# Patient Record
Sex: MALE | Race: WHITE | NOT HISPANIC OR LATINO | Employment: FULL TIME | RURAL
[De-identification: names, ages, dates, MRNs, and addresses within clinical notes are randomized per-mention and may not be internally consistent; named-entity substitution may affect disease eponyms.]

---

## 2022-05-28 PROCEDURE — 99283 PR EMERGENCY DEPT VISIT,LEVEL III: ICD-10-PCS | Mod: ,,, | Performed by: EMERGENCY MEDICINE

## 2022-05-28 PROCEDURE — 99283 EMERGENCY DEPT VISIT LOW MDM: CPT | Mod: ,,, | Performed by: EMERGENCY MEDICINE

## 2022-05-28 PROCEDURE — 99283 EMERGENCY DEPT VISIT LOW MDM: CPT

## 2022-05-29 ENCOUNTER — HOSPITAL ENCOUNTER (EMERGENCY)
Facility: HOSPITAL | Age: 32
Discharge: HOME | End: 2022-05-29
Attending: EMERGENCY MEDICINE

## 2022-05-29 VITALS
WEIGHT: 160 LBS | OXYGEN SATURATION: 97 % | HEART RATE: 107 BPM | TEMPERATURE: 98 F | RESPIRATION RATE: 18 BRPM | SYSTOLIC BLOOD PRESSURE: 112 MMHG | DIASTOLIC BLOOD PRESSURE: 67 MMHG | BODY MASS INDEX: 22.4 KG/M2 | HEIGHT: 71 IN

## 2022-05-29 DIAGNOSIS — R52 PAIN: ICD-10-CM

## 2022-05-29 DIAGNOSIS — S93.401A SPRAIN OF RIGHT ANKLE, UNSPECIFIED LIGAMENT, INITIAL ENCOUNTER: Primary | ICD-10-CM

## 2022-05-29 RX ORDER — IBUPROFEN 600 MG/1
600 TABLET ORAL EVERY 6 HOURS PRN
Qty: 20 TABLET | Refills: 0 | Status: SHIPPED | OUTPATIENT
Start: 2022-05-29

## 2022-05-29 NOTE — ED PROVIDER NOTES
Encounter Date: 5/28/2022       History     Chief Complaint   Patient presents with    Ankle Pain     Pt state injured right ankle at work at approx 1400 today,  swelling noted to right ankle     31-year-old male patient came to the emergency department complaining of right ankle pain.  The patient states that he was walking and he twisted his right ankle started having pain and he was unable to put weight on his right foot.  There is no other reported injury.    The history is provided by the patient.     Review of patient's allergies indicates:  No Known Allergies  History reviewed. No pertinent past medical history.  No past surgical history on file.  History reviewed. No pertinent family history.     Review of Systems   Constitutional: Negative for fever.   HENT: Negative for sore throat.    Respiratory: Negative for shortness of breath.    Cardiovascular: Negative for chest pain.   Gastrointestinal: Negative for nausea.   Genitourinary: Negative for dysuria.   Musculoskeletal: Positive for joint swelling. Negative for back pain.   Skin: Negative for rash.   Neurological: Negative for weakness.   Hematological: Does not bruise/bleed easily.   All other systems reviewed and are negative.      Physical Exam     Initial Vitals [05/28/22 2359]   BP Pulse Resp Temp SpO2   112/67 107 18 98.1 °F (36.7 °C) 97 %      MAP       --         Physical Exam    Constitutional: He appears well-developed and well-nourished. He is diaphoretic. He appears distressed.   HENT:   Head: Normocephalic and atraumatic.   Eyes: EOM are normal. Pupils are equal, round, and reactive to light. Right eye exhibits no discharge. Left eye exhibits no discharge.   Neck: Neck supple.   Normal range of motion.  Pulmonary/Chest: No respiratory distress.   Musculoskeletal:         General: Tenderness (Right ankle) present.      Cervical back: Normal range of motion and neck supple.        Feet:      Neurological: He is alert and oriented to person,  Letter sent to Pt with normal pap results.    place, and time.   Skin: Skin is warm.         Medical Screening Exam   See Full Note    ED Course   Procedures  Labs Reviewed - No data to display       Imaging Results          X-Ray Ankle Complete Right (Final result)  Result time 05/29/22 08:50:15    Final result by Bne Ross MD (05/29/22 08:50:15)                 Impression:      Soft tissue swelling about the ankle medially    No definite acute bony abnormality      Electronically signed by: Ben Ross  Date:    05/29/2022  Time:    08:50             Narrative:    EXAMINATION:  XR ANKLE COMPLETE 3 VIEW RIGHT    CLINICAL HISTORY:  .  Pain, unspecified.  Right ankle pain.  Ankle injury    COMPARISON:  None    TECHNIQUE:  AP, lateral, and oblique views right ankle    FINDINGS:  There is no acute fracture or dislocation.  Ankle mortise is well preserved.  There is no focal lytic or blastic lesion.    There is soft tissue swelling about the ankle medially                                 Medications - No data to display                    Clinical Impression:   Final diagnoses:  [R52] Pain  [S93.401A] Sprain of right ankle, unspecified ligament, initial encounter (Primary)          ED Disposition Condition    Discharge Stable        ED Prescriptions     Medication Sig Dispense Start Date End Date Auth. Provider    ibuprofen (ADVIL,MOTRIN) 600 MG tablet Take 1 tablet (600 mg total) by mouth every 6 (six) hours as needed for Pain. 20 tablet 5/29/2022  Domingo Arzate MD        Follow-up Information    None          Domingo Arzate MD  05/30/22 3094

## 2022-07-20 ENCOUNTER — HOSPITAL ENCOUNTER (EMERGENCY)
Facility: HOSPITAL | Age: 32
Discharge: HOME | End: 2022-07-20

## 2022-07-20 VITALS
TEMPERATURE: 99 F | HEART RATE: 98 BPM | OXYGEN SATURATION: 98 % | DIASTOLIC BLOOD PRESSURE: 89 MMHG | RESPIRATION RATE: 20 BRPM | WEIGHT: 159 LBS | BODY MASS INDEX: 22.26 KG/M2 | HEIGHT: 71 IN | SYSTOLIC BLOOD PRESSURE: 122 MMHG

## 2022-07-20 DIAGNOSIS — H92.02 OTALGIA, LEFT: ICD-10-CM

## 2022-07-20 DIAGNOSIS — J02.9 VIRAL PHARYNGITIS: Primary | ICD-10-CM

## 2022-07-20 DIAGNOSIS — H66.92 LEFT OTITIS MEDIA, UNSPECIFIED OTITIS MEDIA TYPE: ICD-10-CM

## 2022-07-20 LAB
FLUAV AG UPPER RESP QL IA.RAPID: NEGATIVE
FLUBV AG UPPER RESP QL IA.RAPID: NEGATIVE
RAPID GROUP A STREP: NEGATIVE
SARS-COV+SARS-COV-2 AG RESP QL IA.RAPID: NEGATIVE

## 2022-07-20 PROCEDURE — 87880 STREP A ASSAY W/OPTIC: CPT | Performed by: NURSE PRACTITIONER

## 2022-07-20 PROCEDURE — 99283 EMERGENCY DEPT VISIT LOW MDM: CPT | Mod: ,,, | Performed by: NURSE PRACTITIONER

## 2022-07-20 PROCEDURE — 99283 PR EMERGENCY DEPT VISIT,LEVEL III: ICD-10-PCS | Mod: ,,, | Performed by: NURSE PRACTITIONER

## 2022-07-20 PROCEDURE — 99283 EMERGENCY DEPT VISIT LOW MDM: CPT

## 2022-07-20 PROCEDURE — 87428 SARSCOV & INF VIR A&B AG IA: CPT | Performed by: NURSE PRACTITIONER

## 2022-07-20 RX ORDER — AMOXICILLIN AND CLAVULANATE POTASSIUM 875; 125 MG/1; MG/1
1 TABLET, FILM COATED ORAL 2 TIMES DAILY
Qty: 14 TABLET | Refills: 0 | Status: SHIPPED | OUTPATIENT
Start: 2022-07-20

## 2022-07-20 NOTE — ED PROVIDER NOTES
"Encounter Date: 7/20/2022       History     Chief Complaint   Patient presents with    Sore Throat     31 year old male presents to ED with complaint of throat soreness, left ear pain, and headache for 2 days. He states headache is frontal around eyes and is an ache. Ear described as throbbing fullness. Throat soreness with feeling of "something stuck" to distal part of throat when swallowing. Denies fever, chills, cough, shortness of breath, chest pain. No nausea/vomiting    The history is provided by the patient. No  was used.   Sore Throat   This is a new problem. The sore throat symptoms include difficulty swallowing and a foreign body sensation.The current episode started two days ago. The problem has been unchanged. The pain is worse on the left side. There has been no fever. The pain is at a severity of 5/10. Associated symptoms include ear pain and headaches. Pertinent negatives include no coughing, shortness of breath or vomiting. He has tried nothing for the symptoms. The treatment provided no relief.     Review of patient's allergies indicates:  No Known Allergies  No past medical history on file.  No past surgical history on file.  No family history on file.     Review of Systems   Constitutional: Negative for chills, fatigue and fever.   HENT: Positive for ear pain, postnasal drip, sinus pressure, sinus pain and sore throat.    Eyes: Negative for photophobia and visual disturbance.   Respiratory: Negative for cough and shortness of breath.    Cardiovascular: Negative for chest pain and palpitations.   Gastrointestinal: Negative for nausea and vomiting.   Neurological: Positive for headaches. Negative for weakness.   All other systems reviewed and are negative.      Physical Exam     Initial Vitals [07/20/22 1031]   BP Pulse Resp Temp SpO2   (!) 127/95 107 16 98.7 °F (37.1 °C) 98 %      MAP       --         Physical Exam    Nursing note and vitals reviewed.  Constitutional: He " appears well-developed and well-nourished.   HENT:   Head: Normocephalic and atraumatic.   Left Ear: Tympanic membrane is injected.   Mouth/Throat: Oropharyngeal exudate and posterior oropharyngeal erythema present.       Malodorous breath   Eyes: EOM are normal. Pupils are equal, round, and reactive to light.   Neck:   Normal range of motion.  Cardiovascular: Normal rate and regular rhythm.   Pulmonary/Chest: Breath sounds normal. He has no wheezes. He has no rhonchi.   Abdominal: Abdomen is soft. He exhibits no distension. There is no abdominal tenderness.   Musculoskeletal:         General: No tenderness or edema.      Cervical back: Normal range of motion.     Lymphadenopathy:     He has cervical adenopathy.   Neurological: He is alert and oriented to person, place, and time.   Skin: Skin is warm. Capillary refill takes less than 2 seconds.   Psychiatric: He has a normal mood and affect. Thought content normal.         Medical Screening Exam   See Full Note    ED Course   Procedures  Labs Reviewed   THROAT SCREEN, RAPID STREP - Normal   SARS-COV2 (COVID) W/ FLU ANTIGEN - Normal    Narrative:     Negative SARS-CoV results should not be used as the sole basis for treatment or patient management decisions; negative results should be considered in the context of a patient's recent exposures, history and the presene of clinical signs and symptoms consistent with COVID-19.  Negative results should be treated as presumptive and confirmed by molecular assay, if necessary for patient management.          Imaging Results    None          Medications - No data to display                    Clinical Impression:   Final diagnoses:  [J02.9] Viral pharyngitis (Primary)  [H92.02] Otalgia, left  [H66.92] Left otitis media, unspecified otitis media type          ED Disposition Condition    Discharge Stable        ED Prescriptions     Medication Sig Dispense Start Date End Date Auth. Provider    amoxicillin-clavulanate 875-125mg  (AUGMENTIN) 875-125 mg per tablet Take 1 tablet by mouth 2 (two) times daily. 14 tablet 7/20/2022  JOSEFINA Dsouza        Follow-up Information    None          Courtney Rich, JOSEFINA  07/20/22 2379

## 2022-07-22 ENCOUNTER — HOSPITAL ENCOUNTER (EMERGENCY)
Facility: HOSPITAL | Age: 32
Discharge: HOME | End: 2022-07-22
Attending: EMERGENCY MEDICINE

## 2022-07-22 VITALS
SYSTOLIC BLOOD PRESSURE: 120 MMHG | TEMPERATURE: 98 F | BODY MASS INDEX: 22.4 KG/M2 | RESPIRATION RATE: 16 BRPM | OXYGEN SATURATION: 97 % | DIASTOLIC BLOOD PRESSURE: 92 MMHG | WEIGHT: 160 LBS | HEIGHT: 71 IN | HEART RATE: 98 BPM

## 2022-07-22 DIAGNOSIS — G51.0 BELL'S PALSY: Primary | ICD-10-CM

## 2022-07-22 DIAGNOSIS — H66.90 OTITIS MEDIA, UNSPECIFIED LATERALITY, UNSPECIFIED OTITIS MEDIA TYPE: ICD-10-CM

## 2022-07-22 DIAGNOSIS — J02.9 PHARYNGITIS, UNSPECIFIED ETIOLOGY: ICD-10-CM

## 2022-07-22 PROCEDURE — 63600175 PHARM REV CODE 636 W HCPCS: Performed by: EMERGENCY MEDICINE

## 2022-07-22 PROCEDURE — 99283 PR EMERGENCY DEPT VISIT,LEVEL III: ICD-10-PCS | Mod: ,,, | Performed by: EMERGENCY MEDICINE

## 2022-07-22 PROCEDURE — 99283 EMERGENCY DEPT VISIT LOW MDM: CPT | Mod: ,,, | Performed by: EMERGENCY MEDICINE

## 2022-07-22 PROCEDURE — 99284 EMERGENCY DEPT VISIT MOD MDM: CPT

## 2022-07-22 PROCEDURE — 96372 THER/PROPH/DIAG INJ SC/IM: CPT

## 2022-07-22 PROCEDURE — 25000003 PHARM REV CODE 250: Performed by: EMERGENCY MEDICINE

## 2022-07-22 RX ORDER — MECLIZINE HYDROCHLORIDE 25 MG/1
25 TABLET ORAL
Status: COMPLETED | OUTPATIENT
Start: 2022-07-22 | End: 2022-07-22

## 2022-07-22 RX ORDER — PREDNISONE 20 MG/1
TABLET ORAL
Qty: 22 TABLET | Refills: 0 | Status: SHIPPED | OUTPATIENT
Start: 2022-07-22

## 2022-07-22 RX ORDER — MECLIZINE HYDROCHLORIDE 25 MG/1
25 TABLET ORAL 3 TIMES DAILY PRN
Qty: 20 TABLET | Refills: 0 | Status: SHIPPED | OUTPATIENT
Start: 2022-07-22

## 2022-07-22 RX ORDER — ACYCLOVIR 800 MG/1
800 TABLET ORAL
Qty: 40 TABLET | Refills: 0 | Status: SHIPPED | OUTPATIENT
Start: 2022-07-22 | End: 2023-07-22

## 2022-07-22 RX ORDER — DEXAMETHASONE SODIUM PHOSPHATE 4 MG/ML
8 INJECTION, SOLUTION INTRA-ARTICULAR; INTRALESIONAL; INTRAMUSCULAR; INTRAVENOUS; SOFT TISSUE
Status: COMPLETED | OUTPATIENT
Start: 2022-07-22 | End: 2022-07-22

## 2022-07-22 RX ORDER — KETOROLAC TROMETHAMINE 30 MG/ML
30 INJECTION, SOLUTION INTRAMUSCULAR; INTRAVENOUS
Status: COMPLETED | OUTPATIENT
Start: 2022-07-22 | End: 2022-07-22

## 2022-07-22 RX ADMIN — MECLIZINE HYDROCHLORIDE 25 MG: 25 TABLET ORAL at 09:07

## 2022-07-22 RX ADMIN — DEXAMETHASONE SODIUM PHOSPHATE 8 MG: 4 INJECTION, SOLUTION INTRAMUSCULAR; INTRAVENOUS at 09:07

## 2022-07-22 RX ADMIN — KETOROLAC TROMETHAMINE 30 MG: 60 INJECTION, SOLUTION INTRAMUSCULAR at 09:07

## 2022-07-22 NOTE — ED PROVIDER NOTES
Encounter Date: 7/22/2022       History     Chief Complaint   Patient presents with    Facial Droop     Patient presents with headache left ear pain sore throat and dizziness associated with nausea that started last night.  Also associated with inability to close the left eye and drooping of the left side of face.  The Bell's palsy type symptoms started last night around midnight.  He was seen in the ER yesterday with a negative COVID test negative strep test and was prescribed Augmentin which she has already started.  Patient denies any weakness or numbness of the arms or legs.  No speech involvement.  Patient denies IV drug use.  Symptoms moderate.  No other associated symptoms modifying factors.        Review of patient's allergies indicates:  No Known Allergies  History reviewed. No pertinent past medical history.  History reviewed. No pertinent surgical history.  History reviewed. No pertinent family history.  Social History     Tobacco Use    Smoking status: Never Smoker    Smokeless tobacco: Never Used   Substance Use Topics    Alcohol use: Never    Drug use: Never     Review of Systems   Constitutional: Negative for fever.   HENT: Positive for congestion, ear pain, sinus pressure and sore throat.    Respiratory: Negative for shortness of breath.    Cardiovascular: Negative for chest pain.   Gastrointestinal: Negative for nausea.   Genitourinary: Negative for dysuria.   Musculoskeletal: Negative for back pain.   Skin: Negative for rash.   Neurological: Positive for dizziness and weakness.   Hematological: Does not bruise/bleed easily.       Physical Exam     Initial Vitals [07/22/22 0825]   BP Pulse Resp Temp SpO2   (!) 123/98 102 16 98 °F (36.7 °C) 96 %      MAP       --         Physical Exam    Constitutional: He appears well-nourished.   HENT:   Head: Normocephalic and atraumatic.   Left external auditory canal mildly erythematous with    Eyes: Pupils are equal, round, and reactive to light.   Neck:  Neck supple.   Normal range of motion.  Cardiovascular: Normal rate and regular rhythm.   Pulmonary/Chest: Breath sounds normal.   Abdominal: Abdomen is soft.   Musculoskeletal:         General: Normal range of motion.      Cervical back: Normal range of motion and neck supple.     Neurological: He is alert and oriented to person, place, and time.   Extinguishing nystagmus present.  Patient has drooping of the left side of the face.  Involves both the upper face and lower face.  Left eye does not shot completely.  Visual fields are preserved.  Motor strength 5/5 in arms and legs sensation intact to light touch bilaterally.  Finger-nose-finger is normal.  No dysarthria or aphasia.   Skin: Skin is warm. Capillary refill takes less than 2 seconds.   Psychiatric: He has a normal mood and affect. Thought content normal.         Medical Screening Exam   See Full Note    ED Course   Procedures  Labs Reviewed - No data to display       Imaging Results    None          Medications   meclizine tablet 25 mg (25 mg Oral Given 7/22/22 0934)   dexamethasone injection 8 mg (8 mg Intramuscular Given 7/22/22 0934)   ketorolac injection 30 mg (30 mg Intramuscular Given 7/22/22 0934)                 ED Course as of 07/22/22 0948 Fri Jul 22, 2022   0926 Will treat with meclizine for his dizziness and nystagmus which is likely related to a viral or bacterial infection that is causing inflammation of 7th cranial nerve and therefore Bell's palsy.  Will treat with Decadron in the ER.  Patient will continue Augmentin.  Also will prescribe acyclovir and a 2 week course of prednisone.  Patient seen by family medicine resident in the ER also tube patient will follow-up in 4 days.  Patient voiced understanding return the ER if symptoms worsen or new symptoms develop [PK]      ED Course User Index  [PK] Charlie Heller MD          Clinical Impression:   Final diagnoses:  [G51.0] Bell's palsy (Primary)  [H66.90] Otitis media, unspecified  laterality, unspecified otitis media type  [J02.9] Pharyngitis, unspecified etiology          ED Disposition Condition    Discharge Stable        ED Prescriptions     Medication Sig Dispense Start Date End Date Auth. Provider    acyclovir (ZOVIRAX) 800 MG Tab Take 1 tablet (800 mg total) by mouth 5 (five) times daily. 40 tablet 7/22/2022 7/22/2023 Charlie Heller MD    predniSONE (DELTASONE) 20 MG tablet Take 2 pills PO q day for 5 days then 1 PO q day 22 tablet 7/22/2022  Charlie Heller MD    meclizine (ANTIVERT) 25 mg tablet Take 1 tablet (25 mg total) by mouth 3 (three) times daily as needed. 20 tablet 7/22/2022  Charlie Heller MD        Follow-up Information     Follow up With Specialties Details Why Contact Info    [facility] - Emergency Department Emergency Medicine  If symptoms worsen [address] [place] [id number]  [phone]    Kenroy Chen,  Family Medicine Go to  Tuesday 8:00 - 10:30 am. [address] [facility] [address]  [phone]             Charlie Heller MD  07/22/22 6496

## 2022-07-22 NOTE — DISCHARGE INSTRUCTIONS
To keep your left eye from getting dry when you sleep use Lacri-Lube lubricating ointment.  During the day he can use eye moistening drops    To keep taking the Augmentin/amoxicillin clavulanic acid that was prescribed to you yesterday    Start taking the new prescription medications prescribed today.